# Patient Record
Sex: FEMALE | Race: ASIAN
[De-identification: names, ages, dates, MRNs, and addresses within clinical notes are randomized per-mention and may not be internally consistent; named-entity substitution may affect disease eponyms.]

---

## 2017-11-24 PROBLEM — Z00.00 ENCOUNTER FOR PREVENTIVE HEALTH EXAMINATION: Status: ACTIVE | Noted: 2017-11-24

## 2018-01-03 ENCOUNTER — APPOINTMENT (OUTPATIENT)
Dept: CARDIOLOGY | Facility: CLINIC | Age: 78
End: 2018-01-03

## 2019-02-27 ENCOUNTER — APPOINTMENT (OUTPATIENT)
Dept: NEUROLOGY | Facility: CLINIC | Age: 79
End: 2019-02-27
Payer: MEDICARE

## 2019-02-27 VITALS
BODY MASS INDEX: 17.87 KG/M2 | HEART RATE: 106 BPM | SYSTOLIC BLOOD PRESSURE: 141 MMHG | HEIGHT: 60 IN | DIASTOLIC BLOOD PRESSURE: 91 MMHG | WEIGHT: 91 LBS

## 2019-02-27 VITALS — HEART RATE: 103 BPM | DIASTOLIC BLOOD PRESSURE: 80 MMHG | SYSTOLIC BLOOD PRESSURE: 118 MMHG

## 2019-02-27 DIAGNOSIS — G20 PARKINSON'S DISEASE: ICD-10-CM

## 2019-02-27 DIAGNOSIS — K59.00 CONSTIPATION, UNSPECIFIED: ICD-10-CM

## 2019-02-27 PROCEDURE — 99214 OFFICE O/P EST MOD 30 MIN: CPT

## 2019-02-27 NOTE — PHYSICAL EXAM
[General Appearance - Alert] : alert [General Appearance - In No Acute Distress] : in no acute distress [General Appearance - Well Nourished] : well nourished [Affect] : the affect was normal [Cranial Nerves Oculomotor (III)] : extraocular motion intact [Cranial Nerves Facial (VII)] : face symmetrical [Involuntary Movements] : no involuntary movements were seen [Sensation Tactile Decrease] : light touch was intact [FreeTextEntry1] : There is mild masking. EOMI. There is a mild rest tremor in her right hand. Rapid movements mildly decrements R>L. There is 1+ rigidity at the wrists. Walks with good strides and en bloc turns. Right armswing is depressed. Recovers on pull test.  [FreeTextEntry8] : refer to movement exam

## 2019-02-27 NOTE — DISCUSSION/SUMMARY
[FreeTextEntry1] : Patient with PD who has overnight hypokinesia and tremor.She also experiences wearing off SOB and anxiety. \par \par Recommendations\par Take sinemet 25/100 1 tab q 4hrs (4 doses/d)\par Start sinemet CR 50/200 1 tab qhs\par Encouraged increase in exercising\par Follow-up 3months

## 2019-02-27 NOTE — HISTORY OF PRESENT ILLNESS
[FreeTextEntry1] : Patient with 5 year history of PD. She is maintained on sinemet 25/100 1 tab five times per day. She reports that her right leg tremors when seated or lying down. She is unaware of sinemet kinetics. She walks for exercise and has not fallen. She feels weak at times, which can triggered by anxiety. her anxiety occurs twice per day and alleviated sometimes by deep breathing. It usually doesn’t last more than a few minutes. She can experience some mild SOB at the end of her dose. Overnight, the leg tremors and bradykinesia bother her. \par \par Nonmotor sxms\par Constipation managed with trulance\par Sleep is fragmented and has not started melatonin\par \par Neuro meds\par sinemet 25/100 1 tab (8I--630-10P)\par \par

## 2019-03-01 ENCOUNTER — RX RENEWAL (OUTPATIENT)
Age: 79
End: 2019-03-01

## 2019-03-22 ENCOUNTER — OTHER (OUTPATIENT)
Age: 79
End: 2019-03-22

## 2019-04-15 ENCOUNTER — OTHER (OUTPATIENT)
Age: 79
End: 2019-04-15

## 2019-06-03 ENCOUNTER — APPOINTMENT (OUTPATIENT)
Dept: NEUROLOGY | Facility: CLINIC | Age: 79
End: 2019-06-03
Payer: MEDICARE

## 2019-06-03 VITALS
HEIGHT: 60 IN | HEART RATE: 80 BPM | SYSTOLIC BLOOD PRESSURE: 142 MMHG | DIASTOLIC BLOOD PRESSURE: 81 MMHG | BODY MASS INDEX: 17.67 KG/M2 | WEIGHT: 90 LBS

## 2019-06-03 PROCEDURE — 99214 OFFICE O/P EST MOD 30 MIN: CPT

## 2019-06-03 NOTE — DISCUSSION/SUMMARY
[FreeTextEntry1] : Patient with PD with increased tremor, particularly at night\par urinary frequency\par Fragmented sleep\par \par Recommendations\par Switch 10P c/L IR to CR 25/100\par F/U with urology re. increased urinary frequency\par start melatonin 1mg qhs\par \par Follow-up 3months

## 2019-06-03 NOTE — HISTORY OF PRESENT ILLNESS
[FreeTextEntry1] : Patient with 5 year history of PD. She is maintained on sinemet 25/100 1 tab five times per day. She did not tolerate CR as it made her drowsy at night. Over the past week, she has noticed an increase in her right arm and leg. She states that  the tremor is most bothersome at night. Denies any falls. Does not have significant OFF time or dyskinesia. \par \par Nonmotor sxms\par Constipation - on linzess\par Sleep is fragmented and has not started melatonin\par has urinary frequency at night\par Exercises with a bike\par \par Neuro meds\par sinemet 25/100 1 tab (4Z--630-10P)\par \par Prior Meds\par azilect - itching\par \par

## 2019-06-03 NOTE — PHYSICAL EXAM
[FreeTextEntry1] : There is 2+ masking. Mild hypophonia.There is mild rest tremor of low amplitude in her right hand and foot. There is mild right side bradykinesia with 1+ rigidity. Walks with good SL and turns. Pull test is negative and right armswing is depressed

## 2019-06-11 ENCOUNTER — OTHER (OUTPATIENT)
Age: 79
End: 2019-06-11

## 2019-09-17 ENCOUNTER — APPOINTMENT (OUTPATIENT)
Dept: NEUROLOGY | Facility: CLINIC | Age: 79
End: 2019-09-17
Payer: MEDICARE

## 2019-09-17 VITALS
HEART RATE: 88 BPM | WEIGHT: 92 LBS | DIASTOLIC BLOOD PRESSURE: 91 MMHG | HEIGHT: 60 IN | BODY MASS INDEX: 18.06 KG/M2 | SYSTOLIC BLOOD PRESSURE: 161 MMHG

## 2019-09-17 PROCEDURE — 99213 OFFICE O/P EST LOW 20 MIN: CPT

## 2019-09-17 NOTE — HISTORY OF PRESENT ILLNESS
[FreeTextEntry1] : Patient with 5 year history of PD. She is maintained on sinemet 25/100 1 tab five times per day. She reports that CR made her feel drowsy in the morning and switched back to IR at 10PM.She finds that the first dose wears off 3-3.5hrs prior to the next dose. The other doses do not usually wear off. She had one fall due to tripping on a trampoline. Notes occasional right arm tremor. \par \par Nonmotor sxms\par Constipation - managed with colace and diet\par Sleep is fragmented- did not like melatonin\par has urinary frequency at night\par Exercises with a bike\par \par Neuro meds\par sinemet 25/100 1 tab (1A--630-10P)\par \par Prior Meds\par azilect - itching\par \par

## 2019-09-17 NOTE — PHYSICAL EXAM
[FreeTextEntry1] : There is 2+ masking. Mild hypophonia. There is mild right side bradykinesia with 1+ rigidity. Tremor was absent. Walks with good SL and turns. Pull test is negative and right armswing is depressed

## 2019-09-17 NOTE — DISCUSSION/SUMMARY
[FreeTextEntry1] : Patient with PD x 5 years who is motorically stable. Morning dose of levodopa tends to wear off sooner. \par \par Recommendations:\par \par - change c/l IR to 1 tab q3.5hrs (8AM-1130-3-630-10)\par - increase exercises\par \par RTO 3months\par

## 2019-12-17 ENCOUNTER — APPOINTMENT (OUTPATIENT)
Dept: NEUROLOGY | Facility: CLINIC | Age: 79
End: 2019-12-17
Payer: MEDICARE

## 2019-12-17 VITALS
HEIGHT: 60 IN | SYSTOLIC BLOOD PRESSURE: 129 MMHG | WEIGHT: 94 LBS | BODY MASS INDEX: 18.46 KG/M2 | DIASTOLIC BLOOD PRESSURE: 79 MMHG | HEART RATE: 98 BPM

## 2019-12-17 PROCEDURE — 99213 OFFICE O/P EST LOW 20 MIN: CPT

## 2019-12-17 RX ORDER — CARBIDOPA AND LEVODOPA 50; 200 MG/1; MG/1
50-200 TABLET, EXTENDED RELEASE ORAL
Qty: 30 | Refills: 3 | Status: DISCONTINUED | COMMUNITY
Start: 2019-02-27 | End: 2019-12-17

## 2019-12-17 NOTE — DISCUSSION/SUMMARY
[FreeTextEntry1] : Mild mid afternoon wearing offs\par levodopa asymptomatic AM hypotension\par \par Patient counseled on the following:\par 1. Raise 12P dose of sinemet to 1.5 tabs to address wearing off. Leave other doses at 1 tab\par 2. cont daily exercises. \par 3. maintain good sleep hygiene. She is not interested in taking a sleep medication\par \par RTO 3months

## 2019-12-17 NOTE — HISTORY OF PRESENT ILLNESS
[FreeTextEntry1] : Patient with 5 year history of PD. She has been stable overall. She experiences wearing off prior to the 3PM dose. She experiences hypotension (SBPs 90s) 1.5hrs after her first dose of c/L. Denies any falls. \par Her right hand tremor is bothersome at times. She does all ADLs independently but tires easily. \par \par \par Nonmotor sxms\par Constipation - managed with colace and diet\par Sleep is fragmented\par has urinary frequency at night\par Exercises with a bike daily\par No dysphagia\par Mood is stable with occasional irritability\par \par Neuro meds\par  c/l IR to 1 tab q3.5hrs (8AM-1130-3-630-10)\par \par Prior Meds\par azilect - itching\par sinemet CR\par

## 2019-12-17 NOTE — PHYSICAL EXAM
[FreeTextEntry1] : There is 2+ masking. Mild hypophonia. There is mild right side bradykinesia with 1+ rigidity. Tremor was 1+.  Walks with good SL and turns. Pull test is negative and right armswing is depressed

## 2020-03-31 ENCOUNTER — NON-APPOINTMENT (OUTPATIENT)
Age: 80
End: 2020-03-31

## 2020-03-31 ENCOUNTER — APPOINTMENT (OUTPATIENT)
Dept: NEUROLOGY | Facility: CLINIC | Age: 80
End: 2020-03-31

## 2020-03-31 ENCOUNTER — APPOINTMENT (OUTPATIENT)
Dept: NEUROLOGY | Facility: CLINIC | Age: 80
End: 2020-03-31
Payer: MEDICARE

## 2020-03-31 ENCOUNTER — TRANSCRIPTION ENCOUNTER (OUTPATIENT)
Age: 80
End: 2020-03-31

## 2020-03-31 PROCEDURE — G2012 BRIEF CHECK IN BY MD/QHP: CPT

## 2020-07-08 ENCOUNTER — APPOINTMENT (OUTPATIENT)
Dept: NEUROLOGY | Facility: CLINIC | Age: 80
End: 2020-07-08

## 2020-07-22 ENCOUNTER — APPOINTMENT (OUTPATIENT)
Dept: NEUROLOGY | Facility: CLINIC | Age: 80
End: 2020-07-22
Payer: MEDICARE

## 2020-07-22 DIAGNOSIS — R13.10 DYSPHAGIA, UNSPECIFIED: ICD-10-CM

## 2020-07-22 DIAGNOSIS — F32.89 OTHER SPECIFIED DEPRESSIVE EPISODES: ICD-10-CM

## 2020-07-22 PROCEDURE — 99214 OFFICE O/P EST MOD 30 MIN: CPT | Mod: 95

## 2020-07-28 ENCOUNTER — APPOINTMENT (OUTPATIENT)
Dept: NEUROSURGERY | Facility: CLINIC | Age: 80
End: 2020-07-28

## 2020-07-28 NOTE — HISTORY OF PRESENT ILLNESS
[FreeTextEntry1] : She reports that she is weaker over the last 1month. She has lost weight and has reportedly had a normal workup by her PCP. She does not appreciate levodopa kinetics at this time. She has been taking care of her sick  and is not getting much sleep at night. She has difficulty doing many of her chores at this time due to exhaustion. \par \par Nonmotor sxms\par Constipation - managed with colace and diet\par She has some difficulty with swallowing and coughs often on dry foods. \par Mood is stable with occasional irritability\par \par Neuro meds\par  c/l IR to 1 tab 8A, 3P, 630P, 10P) 1.5 tabs@ 1130A\par \par Prior Meds\par azilect - itching\par sinemet CR

## 2020-07-28 NOTE — DISCUSSION/SUMMARY
[FreeTextEntry1] : Patient with PD who has symptoms of caregiver burnout and depression that is impacting her ADLs and led to physical decline. \par Weighloss/dysphagia\par \par \par Patient counseled on the following:\par Continue sinemet regimen\par start lexapro 5mg daily. \par referred for home care assessment due to her functional decline. \par referred for swallow therapy\par refer for home PT\par \par f/u 2months

## 2020-07-28 NOTE — PHYSICAL EXAM
[FreeTextEntry1] : There is 2+ masking. Mild hypophonia. There is mild right side bradykinesia.. Walks slowly without FOG. Right hand tremors when walking\par

## 2020-08-20 ENCOUNTER — APPOINTMENT (OUTPATIENT)
Dept: SPEECH THERAPY | Facility: CLINIC | Age: 80
End: 2020-08-20
Payer: MEDICARE

## 2020-08-20 PROCEDURE — 92610 EVALUATE SWALLOWING FUNCTION: CPT | Mod: GN

## 2020-08-20 NOTE — REASON FOR VISIT
[Clinical Swallow] : clinical swallow evaluation [Initial] : initial visit for [Family Member] : family member

## 2020-08-22 NOTE — ASSESSMENT
[FreeTextEntry1] : Date of Report: 2020\par Date of Evaluation: 2020\par Patient Name: Lyric Gruber\par : 1940        CA: 79\par Primary Diagnosis: Parkinson's disease (G20)\par Treatment Diagnosis: Oropharyngeal Dysphagia (R13.12)\par Referring Physician: Dr. Db Baird\par Date of Onset: 1.5 months ago\par \par REASON FOR REFERRAL: Lyric Gruber is a 79 year-old female who presented to the [facility] for a Clinical Dysphagia Evaluation upon the referral of her neurologist, Dr. Db Baird. PMHx is significant for Parkinson's disease (dx'd ). This test was ordered to assess oral and pharyngeal stages of swallowing and assess for diet texture change as appropriate. \par \par HISTORY OF PRESENTING ILLNESS: The patient presented to today's evaluation with complaints of difficulty swallowing over the last 1.5 months characterized by difficulty "pushing down" solids foods and pills, sensation of food "coming back up" and intermittent coughing throughout meals. She further reported the need to consume softer, cut-up foods. Additionally, the patient described recent changes in her speech characterized by a slurred quality and "stutter". According to the patient, she recently had a speech-language evaluation completed approximately 2 weeks ago through [facility] care services, however she has yet to have started any speech therapy. Upon further clinician questioning, the patient reported some recent weight loss and that she has been feeling weaker.  She denied recent history of pneumonia and choking.\par \par Current Diet:\par Solids:   Soft, Cut-up\par Liquids:   Thin\par \par MEDICAL HISTORY, per EMR:\par Active Problems\par Constipation, unspecified constipation type (564.00) (K59.00)\par Parkinson disease (332.0) (G20)\par \par Current Meds\par Carbidopa-Levodopa  MG Oral Tablet; Take 5.5 tabs daily as instructed by doctor\par Trulance 3 MG Oral Tablet\par \par Allergies\par No Known Drug Allergies\par \par CLINICAL FINDINGS:\par Oroperipheral Assessment:\par Facial Symmetry:  Within Functional Limits\par Oral Musculature:  Within Functional Limits\par Dentition:  Incomplete; missing some upper and lower dentition\par Labial Strength, Range and Coordination::  Mildly reduced upon pursing and retraction\par Lingual Strength, Range and Coordination::  Mildly reduced upon protrusion, elevation and lateralization; +fasciculations\par Mandibular Function: Within Functional Limits\par Secretion Management:  Adequate\par Volitional Cough:  Strong\par Volitional Swallow:  Intact\par \par Cognition:  Patient was grossly oriented to all concepts, able to follow simple one and two step directives, and able to efficiently communicate wants and needs\par Vocal Quality:  Mildly reduced volume perceived upon informal conversation\par Motor Speech:  Mildly reduced articulatory precision noted upon informal conversation\par \par Consistencies Administered:\par Solids:  Puree; Mechanical Soft; Regular Solid\par Liquids:  Nectar-Thick; Thin\par \par Oral Stage: The patient demonstrated a mild to moderate oral dysphagia exacerbated by decreased dentition status and characterized by slow mastication, decreased bolus collection and weak lingual motion with delayed anterior-posterior transfer for regular solid texture. Reduced bolus control with premature spillover base of tongue suspected for thin liquids. Functional bolus collection, manipulation and transfer noted for puree, mechanical soft and nectar-thick liquid.\par \par Pharyngeal Stage:  The patient demonstrated a suspected mild to moderate pharyngeal dysphagia characterized by a suspected delay in pharyngeal triggering and reduced laryngeal elevation upon digital palpation. Coughing was exhibited post deglutition for thin liquids suggestive of laryngeal penetration versus aspiration. No overt, clinical signs/symptoms of impaired airway protection were noted for puree, mechanical soft, regular solids and nectar-thick liquids.\par \par IMPRESSIONS: Mild to Moderate Oropharyngeal Dysphagia\par \par PROGNOSIS: Good for recommended diet\par \par RECOMMENDATIONS:\par 1)   Mechanical Soft with Nectar-Thick Liquids\par 2)   Safe Swallow Guidelines: Upright position (90 degrees) during/after eating for 30 minutes; Eat/drink slowly; Small bites; Small single cup sips; Allow time between intake; No straws.\par 3)   Maintain Aspiration Precautions\par 4)   Swallow Therapy is warranted to maximize the overall swallow mechanism while ensuring safe and efficient PO intake. An objective swallow test may be considered (i.e. FEES/MBSS) upon completion of therapy to determine tolerance for possible diet advancement.\par 5)   Follow up with physician as directed\par \par EDUCATION: The aforementioned findings and recommendations were reviewed at length the patient and family member, at which time full understanding was demonstrated.\par \par Should you have any additional questions/concerns, please contact the center at [phone]\par \par Johnna Cedeno M.S., CCC-SLP\par Speech-Language Pathologist\par [facility]

## 2020-08-28 ENCOUNTER — APPOINTMENT (OUTPATIENT)
Dept: NEUROLOGY | Facility: CLINIC | Age: 80
End: 2020-08-28
Payer: MEDICARE

## 2020-08-28 VITALS
WEIGHT: 81 LBS | BODY MASS INDEX: 15.9 KG/M2 | DIASTOLIC BLOOD PRESSURE: 82 MMHG | HEIGHT: 60 IN | SYSTOLIC BLOOD PRESSURE: 135 MMHG | HEART RATE: 95 BPM

## 2020-08-28 PROCEDURE — 99214 OFFICE O/P EST MOD 30 MIN: CPT

## 2020-08-28 NOTE — HISTORY OF PRESENT ILLNESS
[FreeTextEntry1] : Patient is doing modestly better. Her anxiety has lessened with lexapro and appetite has improved. However she continues to lose weight despite medical w/u. Has not had mammogram or body CT\par \par She feels slower in the evenings and needs assistance walking overnight. During the day she gets close to 3hrs of ON w/o LID. Recent swallow Swallow study revealed mild-mod. dysphagia and she is pending swallow therapy. She was recommended to start a [facility] soft diet\par \par Nonmotor sxms\par Constipation - managed with colace and diet\par Sleep is fragmented and not using melatonin\par \par Neuro meds\par  c/l IR to 1 tab 8A, 3P, 630P, 10P) 1.5 tabs@ 1130A\par lexapro 5mg qd\par \par Prior Meds\par azilect - itching\par sinemet CR

## 2020-08-28 NOTE — DISCUSSION/SUMMARY
[FreeTextEntry1] : Patient with PD who is slowly improving. Depression/anxiety is better controlled though some of her slowing likely related to psychmotor stressors. \par Deconditioned with overnight akinesia. \par Sleep is fragmented\par \par \par Patient counseled on the following:\par Increase 630P sinemet to 1.5 tabs\par Start ER 25/100 1 tab at 10PM instead of IR\par Cont lexapro 5mg daily. \par take melatonin 3-5mg qhs \par refer for home PT\par f/u with PCP for mammogram and possible CT c/a/p\par \par f/u 3months\par referred for swallow therapy\par refer for home PT\par \par f/u 2months

## 2020-08-28 NOTE — PHYSICAL EXAM
[FreeTextEntry1] : There is 2+ masking. Mild hypophonia. There is mild right side bradykinesia.. Tone is 1+. Pushes to stand and walks slowly with a cane. Posture is stooped. No FOG

## 2020-10-16 ENCOUNTER — APPOINTMENT (OUTPATIENT)
Dept: NEUROLOGY | Facility: CLINIC | Age: 80
End: 2020-10-16
Payer: MEDICARE

## 2020-10-16 VITALS
HEIGHT: 60 IN | DIASTOLIC BLOOD PRESSURE: 86 MMHG | BODY MASS INDEX: 16.69 KG/M2 | HEART RATE: 101 BPM | SYSTOLIC BLOOD PRESSURE: 142 MMHG | WEIGHT: 85 LBS

## 2020-10-16 PROCEDURE — 99214 OFFICE O/P EST MOD 30 MIN: CPT

## 2020-10-16 NOTE — DISCUSSION/SUMMARY
[FreeTextEntry1] : PD with motoric improvement\par Anxiety under control\par urinary frequency\par \par Patient was counseled on the following recommendations:\par \par - cont levodopa regimen\par - increase melatonin to 5mg qhs\par - f/u PCP for urinary frequency\par - cont lexapro 5mg qd\par - cont home exercises\par \par f/u 3months

## 2020-10-16 NOTE — HISTORY OF PRESENT ILLNESS
[FreeTextEntry1] : Patient's  passed away in Sept. She has been emotional but denies significant feelings of depression or anxiety. She feels her mobility improved with changes to the regimen at last visit. \par She does not have much wearing offs\par Overnight akinesia is better with ER \par Has urinary frequency during the night interrupting her sleep\par Completed course of PT\par chops food into small pieces when eating. Denies any coughing or choking when swallowing\par \par Nonmotor sxms\par Constipation - managed with colace and diet\par Sleep is fragmented and not using melatonin 4mg\par \par Neuro meds\par  c/l IR to 1 tab 8A, 3P, 10P) 1.5 tabs@ 1130A, 630P\par sinemet ER 25/100 1 tab qhs\par lexapro 5mg qd\par \par Prior Meds\par azilect - itching\par sinemet CR

## 2020-10-16 NOTE — PHYSICAL EXAM
[FreeTextEntry1] : There is 2+ masking. Mild hypophonia. There is mild right side bradykinesia.  Tone is nl. Walks slowly without FOG. Right hand tremors when walking\par

## 2020-11-16 ENCOUNTER — RX RENEWAL (OUTPATIENT)
Age: 80
End: 2020-11-16

## 2020-12-15 ENCOUNTER — RX RENEWAL (OUTPATIENT)
Age: 80
End: 2020-12-15

## 2021-01-11 ENCOUNTER — RX RENEWAL (OUTPATIENT)
Age: 81
End: 2021-01-11

## 2021-01-20 ENCOUNTER — APPOINTMENT (OUTPATIENT)
Dept: NEUROLOGY | Facility: CLINIC | Age: 81
End: 2021-01-20

## 2021-04-14 ENCOUNTER — RX RENEWAL (OUTPATIENT)
Age: 81
End: 2021-04-14

## 2021-05-21 ENCOUNTER — APPOINTMENT (OUTPATIENT)
Dept: NEUROLOGY | Facility: CLINIC | Age: 81
End: 2021-05-21

## 2021-05-25 ENCOUNTER — APPOINTMENT (OUTPATIENT)
Dept: NEUROLOGY | Facility: CLINIC | Age: 81
End: 2021-05-25
Payer: MEDICARE

## 2021-05-25 VITALS
HEIGHT: 60 IN | DIASTOLIC BLOOD PRESSURE: 96 MMHG | BODY MASS INDEX: 17.28 KG/M2 | SYSTOLIC BLOOD PRESSURE: 154 MMHG | WEIGHT: 88 LBS | HEART RATE: 98 BPM

## 2021-05-25 VITALS — TEMPERATURE: 97.4 F

## 2021-05-25 PROCEDURE — 99214 OFFICE O/P EST MOD 30 MIN: CPT

## 2021-05-25 PROCEDURE — 99072 ADDL SUPL MATRL&STAF TM PHE: CPT

## 2021-05-25 NOTE — DISCUSSION/SUMMARY
[FreeTextEntry1] : Patient with PD who has mild increase in bradykinesia\par Episodes of confusion upon awakening from a nap\par dysphagia\par Fragmented sleep\par \par Patient counseled on the following:\par Continue sinemet regimen. She would like to see how she feels after course of PT before raising L-dopa\par cont lexapro 5mg qhs\par referred for swallow therapy\par refer for home PT\par Monitor episodes of confusion for now as they are rare. \par Increase melatonin to 8mg qhs\par f/u 3months

## 2021-05-25 NOTE — PHYSICAL EXAM
[FreeTextEntry1] : There is 2+ masking. Mild hypophonia. There is 2+ right side bradykinesia. 2+ rigidity in her limbs. Walks slowly without FOG. Right hand tremors when walking. Postural reflexes stable\par

## 2021-05-25 NOTE — HISTORY OF PRESENT ILLNESS
[FreeTextEntry1] : Patient reports that she feels stiff and slow. She does not notice a difference between 1 vs. 1.5 tabs dose of sinemet. Denies any wearing offs or falls. She has had a few confusional episodes upon awakening from a nap; her aide says that patient is disoriented and thinks there are people at the door. There have not been any confusion or hallucinations at night. \par She is not doing PT at this time. \par \par Nonmotor sxms\par Constipation - managed with colace and diet\par Sleep is fragmented - melatonin 5mg has had modest effects. Also has urinary frequency \par Swallowing can be difficult at times. She was advised to eat a mech soft diet with nectar thick liquids by speech therapist. She did not do swallow therapy as recommended \par Mood is stable\par \par \par Neuro meds\par  c/l IR to 1 tab 8A, 3P, 10P) 1.5 tabs@ 1130A, 630P\par sinemet ER 25/100 1 tab qhs\par lexapro 5mg qd\par \par Prior Meds\par azilect - itching\par

## 2021-08-02 ENCOUNTER — RX RENEWAL (OUTPATIENT)
Age: 81
End: 2021-08-02

## 2021-08-25 ENCOUNTER — APPOINTMENT (OUTPATIENT)
Dept: NEUROLOGY | Facility: CLINIC | Age: 81
End: 2021-08-25
Payer: MEDICARE

## 2021-08-25 VITALS
WEIGHT: 88 LBS | BODY MASS INDEX: 17.28 KG/M2 | SYSTOLIC BLOOD PRESSURE: 120 MMHG | HEART RATE: 101 BPM | DIASTOLIC BLOOD PRESSURE: 82 MMHG | HEIGHT: 60 IN

## 2021-08-25 PROCEDURE — 99214 OFFICE O/P EST MOD 30 MIN: CPT

## 2021-08-25 NOTE — DISCUSSION/SUMMARY
[FreeTextEntry1] : Patient with PD who has mild increase in bradykinesia\par dysphagia\par Fragmented sleep\par \par Patient counseled on the following:\par 1. Will plan for Carbidopa-Levodopa  mg 1 tab every 3 hours total 5 doses a day. \par 2. Continue Carbidopa-Levodopa  mg ER  1 tab at bed time\par 3. Will plan to add  Mirtazepine 7.5 mg one tab daily to improve sleep. \par 4. cont lexapro 5mg qhs\par 5. Referred for home swallow therapy and  home PT\par 6.Exercise/walking \par 7. f/u 3 months\par \par Encouraged to increase exercise and physical activity and maintain an active social and intellectual life.More than 50% of the visit were dedicated to review of treatment, therapeutic plan, and prognosis, and patient was counseled on coping and physical and emotional wellbeing.\par

## 2021-08-25 NOTE — END OF VISIT
[] : Fellow [FreeTextEntry3] : Patient reports increased slowness. She feels more stiff and unaware of LD kinetics. She takes sinemet  1-1.5-1-1.5-1 with ER qhs. Mood is stable. Did not start home PT and occ chokes on tablets. S/s from 2020 showed mild-mod oropharyngeal dysphagia. She does not tolerate thickening her liquids well\par \par On exam: There is 2+ masking. Hypophonia. Tremor absent. There is moderate bradykinesia R>L with 2+ rigidity. She pushes off to stand and shuffles with en bloc turns. Does not recover on pull test\par \par Imp: PD with increased bradykinesia\par Daytime fatigue from insomnia\par Dysphagia\par \par - change sinemet to 1 tab q3hrs (5 doses/d)\par - cont sinemet ER qhs\par - add remeron 7.5mg qhs for insomnia\par - cont lexapro 5mg qd\par - refer to [facility] and swallow therapy\par \par f/u 3months [Time Spent: ___ minutes] : I have spent [unfilled] minutes of time on the encounter.

## 2021-08-25 NOTE — HISTORY OF PRESENT ILLNESS
[FreeTextEntry1] : 80 years old woman with 7 years history of PD. \par \par Intraval history, 8/25/2021: \par Since last visit, Patient reports that she feels same stiff and more slow. Slow walking, with assistance and a device.  No falls since last visit. She is more lethargic/tired. Her sleep is worse. Denies any wearing offs. She is not doing PT at this time, wants to do at home. \par \par Nonmotor sxms\par Constipation - managed with colace and diet\par Sleep is fragmented - takes melatonin 8mg. Also has urinary frequency. \par Swallowing can be difficult at times. She was advised to eat a [facility] soft diet with nectar thick liquids by speech therapist.Mood is stable\par \par \par Neuro meds\par Carbidopa-Levodopa IR to 1 tab 8A, 3P, 10P) 1.5 tabs@ 1130A, 630P\par sinemet ER 25/100 1 tab qhs at 10 pm \par Melatonin 8 mg at bed time \par lexapro 5mg qd\par \par Prior Meds\par azilect - itching\par

## 2022-02-07 ENCOUNTER — RX RENEWAL (OUTPATIENT)
Age: 82
End: 2022-02-07

## 2022-02-24 ENCOUNTER — NON-APPOINTMENT (OUTPATIENT)
Age: 82
End: 2022-02-24

## 2022-02-24 ENCOUNTER — APPOINTMENT (OUTPATIENT)
Dept: NEUROLOGY | Facility: CLINIC | Age: 82
End: 2022-02-24
Payer: MEDICARE

## 2022-02-24 PROCEDURE — 99442: CPT

## 2022-03-01 ENCOUNTER — APPOINTMENT (OUTPATIENT)
Dept: NEUROLOGY | Facility: CLINIC | Age: 82
End: 2022-03-01
Payer: MEDICARE

## 2022-03-01 DIAGNOSIS — F51.04 PSYCHOPHYSIOLOGIC INSOMNIA: ICD-10-CM

## 2022-03-01 PROCEDURE — 99442: CPT | Mod: NC

## 2022-03-01 RX ORDER — MIRTAZAPINE 15 MG/1
15 TABLET, FILM COATED ORAL
Qty: 30 | Refills: 5 | Status: DISCONTINUED | COMMUNITY
Start: 2021-08-25 | End: 2022-03-01

## 2022-03-09 ENCOUNTER — APPOINTMENT (OUTPATIENT)
Dept: NEUROLOGY | Facility: CLINIC | Age: 82
End: 2022-03-09
Payer: MEDICARE

## 2022-03-09 PROCEDURE — 99441: CPT | Mod: NC

## 2022-03-25 ENCOUNTER — APPOINTMENT (OUTPATIENT)
Dept: NEUROLOGY | Facility: CLINIC | Age: 82
End: 2022-03-25
Payer: MEDICARE

## 2022-03-25 VITALS
WEIGHT: 97 LBS | HEIGHT: 60 IN | DIASTOLIC BLOOD PRESSURE: 82 MMHG | HEART RATE: 97 BPM | SYSTOLIC BLOOD PRESSURE: 124 MMHG | BODY MASS INDEX: 19.04 KG/M2

## 2022-03-25 PROCEDURE — 99214 OFFICE O/P EST MOD 30 MIN: CPT

## 2022-03-25 RX ORDER — TRAZODONE HYDROCHLORIDE 50 MG/1
50 TABLET ORAL
Qty: 30 | Refills: 3 | Status: DISCONTINUED | COMMUNITY
Start: 2022-03-01 | End: 2022-03-25

## 2022-03-25 NOTE — HISTORY OF PRESENT ILLNESS
[FreeTextEntry1] : Patient reports that she is having hot flashes during the night with sweating. There is no correlation to timing of her levodopa. \par \par Since starting remeron, she is sleeping about 5hrs at night takes a 2 hr nap in the day. 15mg caused grogginess\par She is not aware of levodopa kinetics\par Has not fallen\par She needs assistance with cooking, laundry, cleaning, dressing and showering\par \par Nonmotor sxms\par Has hot flashes unpredictably --worse at night. \par Constipation : worsened --BM twice per week\par Swallowing can be difficult at times. She was advised to eat a [facility] soft diet with nectar thick liquids by speech therapist.\par Easily irritable and continues to grieve her husbands death >1.5yrs\par \par \par Neuro meds\par  c/l IR to 1 tab 5 times per day (q3hrs)\par sinemet ER 25/100 2 tab qhs\par lexapro 5mg qd\par remeron 7.5mg qhs\par MOM, prune juice\par \par Prior Meds\par azilect - itching\par

## 2022-03-25 NOTE — PHYSICAL EXAM
[FreeTextEntry1] : There is 2+ masking. Mild hypophonia. There is mild right side bradykinesia.. Walks slowly without FOG. Right hand tremors when walking \par

## 2022-03-29 ENCOUNTER — APPOINTMENT (OUTPATIENT)
Dept: NEUROLOGY | Facility: CLINIC | Age: 82
End: 2022-03-29
Payer: MEDICARE

## 2022-03-29 PROCEDURE — 99442: CPT | Mod: NC

## 2022-03-29 RX ORDER — POLYETHYLENE GLYCOL 3350 17 G/17G
17 POWDER, FOR SOLUTION ORAL DAILY
Qty: 1 | Refills: 6 | Status: ACTIVE | COMMUNITY
Start: 2022-03-25 | End: 1900-01-01

## 2022-04-04 ENCOUNTER — APPOINTMENT (OUTPATIENT)
Dept: CARDIOLOGY | Facility: CLINIC | Age: 82
End: 2022-04-04

## 2022-04-06 RX ORDER — OPICAPONE 50 MG/1
50 CAPSULE ORAL
Qty: 30 | Refills: 3 | Status: DISCONTINUED | COMMUNITY
Start: 2022-03-29 | End: 2022-04-06

## 2022-04-22 ENCOUNTER — NON-APPOINTMENT (OUTPATIENT)
Age: 82
End: 2022-04-22

## 2022-05-02 ENCOUNTER — APPOINTMENT (OUTPATIENT)
Dept: CARDIOLOGY | Facility: CLINIC | Age: 82
End: 2022-05-02
Payer: MEDICARE

## 2022-05-02 ENCOUNTER — NON-APPOINTMENT (OUTPATIENT)
Age: 82
End: 2022-05-02

## 2022-05-02 VITALS
BODY MASS INDEX: 18.06 KG/M2 | HEIGHT: 60 IN | WEIGHT: 92 LBS | TEMPERATURE: 98.6 F | OXYGEN SATURATION: 97 % | HEART RATE: 95 BPM | DIASTOLIC BLOOD PRESSURE: 91 MMHG | SYSTOLIC BLOOD PRESSURE: 150 MMHG | RESPIRATION RATE: 16 BRPM

## 2022-05-02 DIAGNOSIS — R01.1 CARDIAC MURMUR, UNSPECIFIED: ICD-10-CM

## 2022-05-02 DIAGNOSIS — Z13.6 ENCOUNTER FOR SCREENING FOR CARDIOVASCULAR DISORDERS: ICD-10-CM

## 2022-05-02 DIAGNOSIS — R06.00 DYSPNEA, UNSPECIFIED: ICD-10-CM

## 2022-05-02 PROCEDURE — 99204 OFFICE O/P NEW MOD 45 MIN: CPT

## 2022-05-02 PROCEDURE — 93000 ELECTROCARDIOGRAM COMPLETE: CPT

## 2022-05-04 PROBLEM — Z13.6 SCREENING FOR CARDIOVASCULAR CONDITION: Status: ACTIVE | Noted: 2022-05-04

## 2022-05-04 PROBLEM — R01.1 CARDIAC MURMUR: Status: ACTIVE | Noted: 2022-05-04

## 2022-05-12 ENCOUNTER — APPOINTMENT (OUTPATIENT)
Dept: GASTROENTEROLOGY | Facility: CLINIC | Age: 82
End: 2022-05-12

## 2022-05-27 ENCOUNTER — APPOINTMENT (OUTPATIENT)
Dept: NEUROLOGY | Facility: CLINIC | Age: 82
End: 2022-05-27

## 2022-08-08 ENCOUNTER — OUTPATIENT (OUTPATIENT)
Dept: OUTPATIENT SERVICES | Facility: HOSPITAL | Age: 82
LOS: 1 days | Discharge: HOME | End: 2022-08-08

## 2022-08-08 ENCOUNTER — APPOINTMENT (OUTPATIENT)
Dept: SPEECH THERAPY | Facility: HOSPITAL | Age: 82
End: 2022-08-08

## 2022-08-08 ENCOUNTER — APPOINTMENT (OUTPATIENT)
Dept: RADIOLOGY | Facility: HOSPITAL | Age: 82
End: 2022-08-08

## 2022-08-08 ENCOUNTER — OUTPATIENT (OUTPATIENT)
Dept: OUTPATIENT SERVICES | Facility: HOSPITAL | Age: 82
LOS: 1 days | End: 2022-08-08

## 2022-08-08 DIAGNOSIS — R13.10 DYSPHAGIA, UNSPECIFIED: ICD-10-CM

## 2022-08-08 PROCEDURE — 74230 X-RAY XM SWLNG FUNCJ C+: CPT | Mod: 26

## 2022-08-11 DIAGNOSIS — R13.10 DYSPHAGIA, UNSPECIFIED: ICD-10-CM

## 2022-08-19 ENCOUNTER — APPOINTMENT (OUTPATIENT)
Dept: NEUROLOGY | Facility: CLINIC | Age: 82
End: 2022-08-19

## 2022-08-19 VITALS
WEIGHT: 90 LBS | SYSTOLIC BLOOD PRESSURE: 163 MMHG | DIASTOLIC BLOOD PRESSURE: 91 MMHG | HEIGHT: 60 IN | HEART RATE: 88 BPM | BODY MASS INDEX: 17.67 KG/M2

## 2022-08-19 PROCEDURE — 99214 OFFICE O/P EST MOD 30 MIN: CPT

## 2022-08-19 RX ORDER — ENTACAPONE 200 MG/1
200 TABLET, FILM COATED ORAL
Qty: 150 | Refills: 3 | Status: COMPLETED | COMMUNITY
Start: 2022-04-06 | End: 2022-08-19

## 2022-08-19 NOTE — PHYSICAL EXAM
[FreeTextEntry1] : Oriented person and place. Does not know day or season. Follows commands well. There is 2+ masking. Mild hypophonia. There is mild right side bradykinesia..Tone is normal.  Walks slowly without FOG. Right hand tremors when walking \par

## 2022-08-19 NOTE — HISTORY OF PRESENT ILLNESS
[FreeTextEntry1] : She is not able to swallow comtan and dc'd it. Finds that ER helps overnight hypokinesia. At around 3AM she takes 1 tab sinemet . During the night, when she wakes up she sees shadows in the her room and sometimes animals or babies. She lacks insight into the hallucinations and does not have agitation or paranoia. \par Does not hallucinate during the day. Watches TV during the day and does some physical activity. Denies any falls. She requires assistance with ambulating, cutting food, and dressing. Does not endorse wearing offs. \par \par \par Nonmotor sxms\par Has hot flashes unpredictably --worse at night. \par Constipation : BM qd-qod managed with miralax\par Swallowing can be difficult at times. She was [facility] to [facility] a [facility] soft diet with nectar thick liquids by speech therapist.\par \par \par Neuro meds\par  c/l IR to 1 tab 5 times per day (q3hrs) 8-11-2-5-8\par sinemet ER 25/100 2 tab qhs\par lexapro 5mg qd\par remeron 7.5mg qhs\par MOM, prune juice\par \par Prior Meds\par azilect - itching\par

## 2022-08-19 NOTE — DISCUSSION/SUMMARY
[FreeTextEntry1] : Patient with PD and cognitive deficits associated with overnight visual hallucinations. May be entering PDD  At this time, since hallucinations are benign will monitor closely for now. \par Anxiety has improved\par sleep fragmentation is also better. \par \par Patient was counseled on the following recommendations:\par \par leave PD regimen the same\par cont lexapro 5mg qd\par cont remeron 7.5mg qhs\par soft diet for dysphagia\par \par \par f/u 3months

## 2022-10-21 ENCOUNTER — RX RENEWAL (OUTPATIENT)
Age: 82
End: 2022-10-21

## 2022-11-03 ENCOUNTER — RX RENEWAL (OUTPATIENT)
Age: 82
End: 2022-11-03

## 2022-12-09 ENCOUNTER — APPOINTMENT (OUTPATIENT)
Dept: NEUROLOGY | Facility: CLINIC | Age: 82
End: 2022-12-09

## 2022-12-09 VITALS
SYSTOLIC BLOOD PRESSURE: 178 MMHG | BODY MASS INDEX: 17.67 KG/M2 | HEIGHT: 60 IN | HEART RATE: 86 BPM | WEIGHT: 90 LBS | DIASTOLIC BLOOD PRESSURE: 92 MMHG

## 2022-12-09 PROCEDURE — 99214 OFFICE O/P EST MOD 30 MIN: CPT

## 2022-12-09 NOTE — HISTORY OF PRESENT ILLNESS
[FreeTextEntry1] : Patient reports hat she has some mild wearing off that occurs once a day and unpredictable Her off periods are usually SOB and anxiety. Has increased rigidity in the mornings at 5am while in bed which resolve after she falls asleep and awakens 2hrs later. Takes a 3AM c/l  which helps her go back to sleep\par Denies any falls\par Anxiety is better control. \par hallucinations have improved, but has hypnopompic hallucinations. \par \par Nonmotor sxms\par Constipation : BM qd managed with miralax\par On mech soft and doing well with swallowing\par sleep is ok has a few nights of early awakening\par + vivid dreams\par \par Neuro meds\par  c/l IR to 1 tab 5 times per day (q3hrs) 3-39-4-8-10- 3AM\par sinemet ER 25/100 2 tab qhs\par lexapro 5mg qd\par remeron 7.5mg qhs\par miralax\par \par Prior Meds\par azilect - itching\par

## 2022-12-09 NOTE — DISCUSSION/SUMMARY
[FreeTextEntry1] : Patient with PD and cognitive deficits with improved sleep, anxiety and VH\par \par \par Patient was counseled on the following recommendations:\par \par leave PD regimen the same\par cont lexapro 5mg qd\par cont remeron 7.5mg qhs\par soft diet for dysphagia\par increase exercising\par \par f/u 3-4months. \par \par

## 2022-12-09 NOTE — PHYSICAL EXAM
[FreeTextEntry1] : Follows commands well. speech is 2+ There is 2+ masking. Mild hypophonia. There is mild right side bradykinesia..Tone is normal.  Walks slowly without FOG. Recovers after 2 steps on pull test\par

## 2023-01-05 DIAGNOSIS — F41.9 ANXIETY DISORDER, UNSPECIFIED: ICD-10-CM

## 2023-01-20 ENCOUNTER — NON-APPOINTMENT (OUTPATIENT)
Age: 83
End: 2023-01-20

## 2023-01-20 RX ORDER — CARBIDOPA AND LEVODOPA 25; 100 MG/1; MG/1
25-100 TABLET, ORALLY DISINTEGRATING ORAL
Qty: 180 | Refills: 5 | Status: ACTIVE | COMMUNITY
Start: 2023-01-20 | End: 1900-01-01

## 2023-04-17 ENCOUNTER — NON-APPOINTMENT (OUTPATIENT)
Age: 83
End: 2023-04-17

## 2023-07-05 ENCOUNTER — RX RENEWAL (OUTPATIENT)
Age: 83
End: 2023-07-05

## 2023-07-21 ENCOUNTER — RX RENEWAL (OUTPATIENT)
Age: 83
End: 2023-07-21

## 2023-09-01 ENCOUNTER — APPOINTMENT (OUTPATIENT)
Dept: NEUROLOGY | Facility: CLINIC | Age: 83
End: 2023-09-01
Payer: MEDICARE

## 2023-09-01 PROCEDURE — 99214 OFFICE O/P EST MOD 30 MIN: CPT

## 2023-09-01 NOTE — HISTORY OF PRESENT ILLNESS
[FreeTextEntry1] : Patient reports that she needs more assistance when walking. Denies any falls.  She experiences wearing offs after 2 hrs. In the off state, her mobility worsens.  She is not doing PT  She is able to feed herself and assist with dressing and washing up.  Overnight akinesia is bothersome with difficulty turning in bed Naps often during the day  Nonmotor sxms Constipation : BM qd managed with miralax On mech soft and doing well with swallowing Sleep is very fragmented and snacks overnight.  Can have noctural hallucinations (people, leaves); she retains insight into them  Neuro meds  c/l IR to 1 tab 5 times per day (q3hrs) 9-06-3-8-10- 3AM sinemet ER 25/100 2 tab qhs lexapro 10mg qd remeron 15mg qhs miralax qd  Prior Meds azilect - itching

## 2023-09-01 NOTE — DISCUSSION/SUMMARY
[FreeTextEntry1] : PDD with sleep fragmentation that is complicating her motor state.  Mirtazapine is an appetite stimulant and I wonder if it is feeding into her compulsion to snack overnight.  Patient was counseled on the following recommendations: We will leave her Sinemet regimen the same We will switch mirtazapine for trazodone 25 to 50 mg at bedtime Continue Lexapro 10 mg daily  will refer her for some home physical therapy  f/u 3months

## 2023-09-01 NOTE — PHYSICAL EXAM
[FreeTextEntry1] : AOx3.  Follows commands well. There is 2+ masking. Mild hypophonia. There is mild right side bradykinesia..Tone is normal.  Walks slowly without FOG. Right hand tremors when walking Postural reflexes intact.

## 2023-09-28 DIAGNOSIS — G47.00 INSOMNIA, UNSPECIFIED: ICD-10-CM

## 2023-10-11 RX ORDER — MIRTAZAPINE 7.5 MG/1
7.5 TABLET, FILM COATED ORAL
Qty: 90 | Refills: 3 | Status: ACTIVE | COMMUNITY
Start: 2023-10-11 | End: 1900-01-01

## 2023-10-11 RX ORDER — TRAZODONE HYDROCHLORIDE 50 MG/1
50 TABLET ORAL
Qty: 30 | Refills: 5 | Status: DISCONTINUED | COMMUNITY
Start: 2023-09-01 | End: 2023-10-11

## 2023-11-07 ENCOUNTER — APPOINTMENT (OUTPATIENT)
Dept: NEUROLOGY | Facility: CLINIC | Age: 83
End: 2023-11-07
Payer: MEDICARE

## 2023-11-07 DIAGNOSIS — R44.3 HALLUCINATIONS, UNSPECIFIED: ICD-10-CM

## 2023-11-07 PROCEDURE — 99442: CPT | Mod: NC

## 2023-11-30 ENCOUNTER — RX RENEWAL (OUTPATIENT)
Age: 83
End: 2023-11-30

## 2023-12-12 ENCOUNTER — RX RENEWAL (OUTPATIENT)
Age: 83
End: 2023-12-12

## 2024-01-05 ENCOUNTER — RX RENEWAL (OUTPATIENT)
Age: 84
End: 2024-01-05

## 2024-01-05 RX ORDER — CARBIDOPA AND LEVODOPA 25; 100 MG/1; MG/1
25-100 TABLET ORAL
Qty: 540 | Refills: 3 | Status: ACTIVE | COMMUNITY
Start: 2021-01-11 | End: 1900-01-01

## 2024-01-08 RX ORDER — ESCITALOPRAM OXALATE 10 MG/1
10 TABLET ORAL
Qty: 90 | Refills: 3 | Status: ACTIVE | COMMUNITY
Start: 2020-07-22 | End: 1900-01-01

## 2024-03-03 ENCOUNTER — RX RENEWAL (OUTPATIENT)
Age: 84
End: 2024-03-03

## 2024-04-11 ENCOUNTER — RX RENEWAL (OUTPATIENT)
Age: 84
End: 2024-04-11

## 2024-05-10 RX ORDER — CARBIDOPA AND LEVODOPA 25; 100 MG/1; MG/1
25-100 TABLET, EXTENDED RELEASE ORAL
Qty: 60 | Refills: 5 | Status: ACTIVE | COMMUNITY
Start: 2020-08-28 | End: 1900-01-01

## 2024-05-23 ENCOUNTER — APPOINTMENT (OUTPATIENT)
Dept: NEUROLOGY | Facility: CLINIC | Age: 84
End: 2024-05-23
Payer: MEDICARE

## 2024-05-23 VITALS
WEIGHT: 100 LBS | SYSTOLIC BLOOD PRESSURE: 133 MMHG | DIASTOLIC BLOOD PRESSURE: 84 MMHG | HEIGHT: 60 IN | BODY MASS INDEX: 19.63 KG/M2 | HEART RATE: 99 BPM

## 2024-05-23 PROCEDURE — 99214 OFFICE O/P EST MOD 30 MIN: CPT

## 2024-05-23 PROCEDURE — G2211 COMPLEX E/M VISIT ADD ON: CPT

## 2024-05-23 RX ORDER — RIVASTIGMINE TARTRATE 3 MG/1
3 CAPSULE ORAL
Qty: 30 | Refills: 5 | Status: ACTIVE | COMMUNITY
Start: 2023-11-07 | End: 1900-01-01

## 2024-05-23 NOTE — DISCUSSION/SUMMARY
[FreeTextEntry1] : Patient and PD since 2013, PDD with hallucinations retained insight.  sleep fragmentation with excessive daytime naps and RBD  Patient was counseled on the following recommendations: increase rivastigmine to 3mg twice a day  add melatonin 3mg at bedtime to help with RBD sx monitor hallucinations  leave levodopa at the same regime  advise to drink caffeine during the day time to help with daytime sleepiness will refer her for some home physical therapy  f/u 3months   Patient seen and staffed with attending Dr.Ramdhani Marivel Costello MD [facility]

## 2024-05-23 NOTE — PHYSICAL EXAM
[FreeTextEntry1] : AOx2.  Follows commands well. There is 2+ masking. Mild hypophonia. There is mild right side bradykinesia..Tone is normal.  Walks slowly without FOG. Right hand tremors when walking Postural reflexes intact.

## 2024-05-23 NOTE — HISTORY OF PRESENT ILLNESS
[FreeTextEntry1] : PD since 2013 Since last visit denies any worsening of symptoms  she currently walks with the help of cane and one person assistance stopped the trazodone and ramelteon, currently on the ramelteon she sleeps through the day and does not sleep at night still has hallucinations retains insight; sees children and insects  Nonmotor sxms Constipation : BM qd managed with miralax no dysphagia , occasional choking wit water Sleep is very fragmented and snacks overnight. complains of kicking, yelling screaming in dreams Can have nocturnal hallucinations (people, insects, leaves); she retains insight into them  Neuro meds  c/l IR to 1 tab 5 times per day (q3hrs) 4-76-1-5-8-- 3AM kicks in 15min Sinemet ER 25/100 2 tab qhs [ at 10pm] Lexapro 10mg qd Remeron 15mg qhs miralax qd rivastigmine 1.5mg twice a day  Prior Meds azilect - itching

## 2024-06-06 ENCOUNTER — RX RENEWAL (OUTPATIENT)
Age: 84
End: 2024-06-06

## 2024-06-06 RX ORDER — MIRTAZAPINE 15 MG/1
15 TABLET, FILM COATED ORAL
Qty: 30 | Refills: 5 | Status: ACTIVE | COMMUNITY
Start: 2022-03-25 | End: 1900-01-01

## 2024-06-12 ENCOUNTER — NON-APPOINTMENT (OUTPATIENT)
Age: 84
End: 2024-06-12

## 2024-06-24 RX ORDER — RAMELTEON 8 MG/1
8 TABLET ORAL
Qty: 30 | Refills: 5 | Status: COMPLETED | COMMUNITY
Start: 2023-09-28 | End: 2024-06-24

## 2024-06-24 RX ORDER — QUETIAPINE FUMARATE 25 MG/1
25 TABLET ORAL
Qty: 15 | Refills: 3 | Status: ACTIVE | COMMUNITY
Start: 2024-06-24 | End: 1900-01-01

## 2024-06-28 ENCOUNTER — NON-APPOINTMENT (OUTPATIENT)
Age: 84
End: 2024-06-28

## 2024-10-01 ENCOUNTER — NON-APPOINTMENT (OUTPATIENT)
Age: 84
End: 2024-10-01

## 2024-10-14 ENCOUNTER — RX RENEWAL (OUTPATIENT)
Age: 84
End: 2024-10-14

## 2024-10-16 ENCOUNTER — RX RENEWAL (OUTPATIENT)
Age: 84
End: 2024-10-16

## 2024-11-11 ENCOUNTER — RX RENEWAL (OUTPATIENT)
Age: 84
End: 2024-11-11

## 2024-12-05 ENCOUNTER — RX RENEWAL (OUTPATIENT)
Age: 84
End: 2024-12-05

## 2024-12-09 ENCOUNTER — RX RENEWAL (OUTPATIENT)
Age: 84
End: 2024-12-09

## 2024-12-16 ENCOUNTER — RX RENEWAL (OUTPATIENT)
Age: 84
End: 2024-12-16

## 2025-05-29 ENCOUNTER — RX RENEWAL (OUTPATIENT)
Age: 85
End: 2025-05-29

## 2025-06-18 ENCOUNTER — APPOINTMENT (OUTPATIENT)
Dept: NEUROLOGY | Facility: CLINIC | Age: 85
End: 2025-06-18

## 2025-08-08 ENCOUNTER — APPOINTMENT (OUTPATIENT)
Dept: GASTROENTEROLOGY | Facility: CLINIC | Age: 85
End: 2025-08-08
Payer: MEDICARE

## 2025-08-08 VITALS
WEIGHT: 66 LBS | BODY MASS INDEX: 12.96 KG/M2 | HEART RATE: 95 BPM | SYSTOLIC BLOOD PRESSURE: 122 MMHG | HEIGHT: 60 IN | RESPIRATION RATE: 12 BRPM | OXYGEN SATURATION: 97 % | DIASTOLIC BLOOD PRESSURE: 78 MMHG

## 2025-08-08 DIAGNOSIS — G20.A1 PARKINSON'S DISEASE WITHOUT DYSKINESIA, WITHOUT MENTION OF FLUCTUATIONS: ICD-10-CM

## 2025-08-08 DIAGNOSIS — R63.4 ABNORMAL WEIGHT LOSS: ICD-10-CM

## 2025-08-08 DIAGNOSIS — R10.13 EPIGASTRIC PAIN: ICD-10-CM

## 2025-08-08 DIAGNOSIS — R10.9 UNSPECIFIED ABDOMINAL PAIN: ICD-10-CM

## 2025-08-08 DIAGNOSIS — R13.10 DYSPHAGIA, UNSPECIFIED: ICD-10-CM

## 2025-08-08 PROCEDURE — 36415 COLL VENOUS BLD VENIPUNCTURE: CPT

## 2025-08-08 PROCEDURE — 99203 OFFICE O/P NEW LOW 30 MIN: CPT

## 2025-08-08 RX ORDER — PANTOPRAZOLE 40 MG/1
40 TABLET, DELAYED RELEASE ORAL DAILY
Qty: 90 | Refills: 3 | Status: ACTIVE | COMMUNITY
Start: 2025-08-08 | End: 1900-01-01

## 2025-08-08 RX ORDER — SUCRALFATE 1 G/10ML
1 SUSPENSION ORAL
Qty: 280 | Refills: 4 | Status: ACTIVE | COMMUNITY
Start: 2025-08-08 | End: 1900-01-01

## 2025-08-11 ENCOUNTER — NON-APPOINTMENT (OUTPATIENT)
Age: 85
End: 2025-08-11

## 2025-08-11 LAB
ALBUMIN SERPL ELPH-MCNC: 4.4 G/DL
ALP BLD-CCNC: 79 U/L
ALT SERPL-CCNC: 6 U/L
ANION GAP SERPL CALC-SCNC: 21 MMOL/L
AST SERPL-CCNC: 18 U/L
BASOPHILS # BLD AUTO: 0.02 K/UL
BASOPHILS NFR BLD AUTO: 0.3 %
BILIRUB SERPL-MCNC: 0.8 MG/DL
BUN SERPL-MCNC: 18 MG/DL
CALCIUM SERPL-MCNC: 10.4 MG/DL
CHLORIDE SERPL-SCNC: 96 MMOL/L
CO2 SERPL-SCNC: 25 MMOL/L
CREAT SERPL-MCNC: 0.56 MG/DL
EGFRCR SERPLBLD CKD-EPI 2021: 90 ML/MIN/1.73M2
EOSINOPHIL # BLD AUTO: 0 K/UL
EOSINOPHIL NFR BLD AUTO: 0 %
GLUCOSE SERPL-MCNC: 101 MG/DL
HCT VFR BLD CALC: 41.9 %
HGB BLD-MCNC: 13.2 G/DL
IMM GRANULOCYTES NFR BLD AUTO: 0.4 %
LYMPHOCYTES # BLD AUTO: 0.66 K/UL
LYMPHOCYTES NFR BLD AUTO: 9.1 %
MAN DIFF?: NORMAL
MCHC RBC-ENTMCNC: 29.9 PG
MCHC RBC-ENTMCNC: 31.5 G/DL
MCV RBC AUTO: 94.8 FL
MONOCYTES # BLD AUTO: 0.47 K/UL
MONOCYTES NFR BLD AUTO: 6.5 %
NEUTROPHILS # BLD AUTO: 6.08 K/UL
NEUTROPHILS NFR BLD AUTO: 83.7 %
PLATELET # BLD AUTO: 277 K/UL
POTASSIUM SERPL-SCNC: 3.4 MMOL/L
PROT SERPL-MCNC: 7.1 G/DL
RBC # BLD: 4.42 M/UL
RBC # FLD: 12.6 %
SODIUM SERPL-SCNC: 142 MMOL/L
WBC # FLD AUTO: 7.26 K/UL

## 2025-08-13 ENCOUNTER — APPOINTMENT (OUTPATIENT)
Dept: NEUROLOGY | Facility: CLINIC | Age: 85
End: 2025-08-13
Payer: MEDICARE

## 2025-08-13 PROCEDURE — 99214 OFFICE O/P EST MOD 30 MIN: CPT

## 2025-08-13 RX ORDER — ENTACAPONE 200 MG/1
200 TABLET, FILM COATED ORAL
Qty: 450 | Refills: 0 | Status: DISCONTINUED | COMMUNITY
Start: 2025-08-13 | End: 2025-08-13